# Patient Record
Sex: MALE | Race: WHITE | URBAN - METROPOLITAN AREA
[De-identification: names, ages, dates, MRNs, and addresses within clinical notes are randomized per-mention and may not be internally consistent; named-entity substitution may affect disease eponyms.]

---

## 2017-01-31 ENCOUNTER — OFFICE VISIT (OUTPATIENT)
Dept: NEUROLOGY | Age: 77
End: 2017-01-31

## 2017-01-31 VITALS
DIASTOLIC BLOOD PRESSURE: 80 MMHG | HEART RATE: 66 BPM | SYSTOLIC BLOOD PRESSURE: 142 MMHG | TEMPERATURE: 98.2 F | RESPIRATION RATE: 16 BRPM | HEIGHT: 72 IN | OXYGEN SATURATION: 98 % | WEIGHT: 206.2 LBS | BODY MASS INDEX: 27.93 KG/M2

## 2017-01-31 DIAGNOSIS — F03.91 DEMENTIA WITH BEHAVIORAL DISTURBANCE, UNSPECIFIED DEMENTIA TYPE: Primary | ICD-10-CM

## 2017-01-31 RX ORDER — RISPERIDONE 0.5 MG/1
TABLET, FILM COATED ORAL 2 TIMES DAILY
COMMUNITY

## 2017-01-31 RX ORDER — LORAZEPAM 1 MG/1
1 TABLET ORAL
COMMUNITY

## 2017-01-31 NOTE — PATIENT INSTRUCTIONS
[address] [facility]   Statement to Patients  April 1, 2014      In an effort to ensure the large volume of patient prescription refills is processed in the most efficient and expeditious manner, we are asking our patients to assist us by calling your Pharmacy for all prescription refills, this will include also your  Mail Order Pharmacy. The pharmacy will contact our office electronically to continue the refill process. Please do not wait until the last minute to call your pharmacy. We need at least 48 hours (2days) to fill prescriptions. We also encourage you to call your pharmacy before going to  your prescription to make sure it is ready. With regard to controlled substance prescription refill requests (narcotic refills) that need to be picked up at our office, we ask your cooperation by providing us with at least 72 hours (3days) notice that you will need a refill. We will not refill narcotic prescription refill requests after 4:00pm on any weekday, Monday through Thursday, or after 2:00pm on Fridays, or on the weekends. We encourage everyone to explore another way of getting your prescription refill request processed using CineCoup, our patient web portal through our electronic medical record system. CineCoup is an efficient and effective way to communicate your medication request directly to the office and  downloadable as an imelda on your smart phone . CineCoup also features a review functionality that allows you to view your medication list as well as leave messages for your physician. Are you ready to get connected? If so please review the attatched instructions or speak to any of our staff to get you set up right away! Thank you so much for your cooperation. Should you have any questions please contact our Practice Administrator.     The Physicians and Staff,  [facility] [address]  What is a living will?  A living will is a legal form you use to write down the kind of care you want at the end of your life. It is used by the health professionals who will treat you if you aren't able to decide for yourself. If you put your wishes in writing, your loved ones and others will know what kind of care you want. They won't need to guess. This can ease your mind and be helpful to others. A living will is not the same as an estate or property will. An estate will explains what you want to happen with your money and property after you die. Is a living will a legal document? A living will is a legal document. Each state has its own laws about living dorantes. If you move to another state, make sure that your living will is legal in the state where you now live. Or you might use a universal form that has been approved by many states. This kind of form can sometimes be completed and stored online. Your electronic copy will then be available wherever you have a connection to the Internet. In most cases, doctors will respect your wishes even if you have a form from a different state. · You don't need an  to complete a living will. But legal advice can be helpful if your state's laws are unclear, your health history is complicated, or your family can't agree on what should be in your living will. · You can change your living will at any time. Some people find that their wishes about end-of-life care change as their health changes. · In addition to making a living will, think about completing a medical power of  form. This form lets you name the person you want to make end-of-life treatment decisions for you (your \"health care agent\") if you're not able to. Many hospitals and nursing homes will give you the forms you need to complete a living will and a medical power of . · Your living will is used only if you can't make or communicate decisions for yourself anymore.  If you become able to make decisions again, you can accept or refuse any treatment, no matter what you wrote in your living will. · Your state may offer an online registry. This is a place where you can store your living will online so the doctors and nurses who need to treat you can find it right away. What should you think about when creating a living will? Talk about your end-of-life wishes with your family members and your doctor. Let them know what you want. That way the people making decisions for you won't be surprised by your choices. Think about these questions as you make your living will:  · Do you know enough about life support methods that might be used? If not, talk to your doctor so you know what might be done if you can't breathe on your own, your heart stops, or you're unable to swallow. · What things would you still want to be able to do after you receive life-support methods? Would you want to be able to walk? To speak? To eat on your own? To live without the help of machines? · If you have a choice, where do you want to be cared for? In your home? At a hospital or nursing home? · Do you want certain Druze practices performed if you become very ill? · If you have a choice at the end of your life, where would you prefer to die? At home? In a hospital or nursing home? Somewhere else? · Would you prefer to be buried or cremated? · Do you want your organs to be donated after you die? What should you do with your living will? · Make sure that your family members and your health care agent have copies of your living will. · Give your doctor a copy of your living will to keep in your medical record. If you have more than one doctor, make sure that each one has a copy. · You may want to put a copy of your living will where it can be easily found. Where can you learn more? Go to http://danial-ramone.info/. Enter K181 in the search box to learn more about \"Learning About Living Alec Fung. \"  Current as of: February 24, 2016  Content Version: 11.1  © 1092-5770 [employer], [employer]. Care instructions adapted under license by [employer] (which disclaims liability or warranty for this information). If you have questions about a medical condition or this instruction, always ask your healthcare professional. [address] any warranty or liability for your use of this information. Agree with management scheme so far and the direction with regard to the memory unit at [facility]. In the meantime will get home health recruited as soon as possible with the hospice designation to help the family transition between the current living situation and [facility]. Follow-up with Dr. Ana Maria Harkins the psychiatrist and if the agitation and defiance is trending higher would give her a call and see if she wants to manipulate the dose of Risperdal.  Revisit here in about 4 months if it is convenient. Continue Namenda and Aricept.

## 2017-01-31 NOTE — PROGRESS NOTES
Neurology Consult      Subjective:      Geneva Henry is a 64 y.o. male who returns with advanced dementia and mood and behavioral issues. Understand he is on the Namenda and on the previous visit was started on Aricept. During this early titration he had agitation that had to be taking care of the [facility] unit. Then managed to wander away from his residence and was taken back to [facility]. In the meantime had been placed on Risperdal low-dose and Ativan as needed. Current needs include the memory unit [facility] with the bed opening up on February 10. We will try to bridge the gap with a call to home health and hospice care to make the transition smoother. Currently has some thought and word blocking according to wife and the son says he has good and bad days. Will have a form of sundowning with response to the end of the day happening. Today was quiet and reserved and no spontaneous speech. Seemed to be very introspective and did not want to interact with me or the staff. In its own capacity the Risperdal has helped redirect his negative mood and behavioral issues for the better. Revisit here in 4 months if that can be accomplished and it is feasible etc.         Current Outpatient Prescriptions   Medication Sig Dispense Refill    LORazepam (ATIVAN) 1 mg tablet Take 1 mg by mouth every six (6) hours as needed for Anxiety.  risperiDONE (RISPERDAL) 0.5 mg tablet Take  by mouth two (2) times a day. 5pm and Qhs      donepezil (ARICEPT) 10 mg tablet Take 1 tablet by mouth daily 30 Tab 5    memantine (NAMENDA) 10 mg tablet Take  by mouth two (2) times a day.  aspirin 81 mg chewable tablet Take 20 mg by mouth daily.  carvedilol (COREG) 3.125 mg tablet Take  by mouth two (2) times daily (with meals).  finasteride (PROSCAR) 5 mg tablet Take 5 mg by mouth daily.  sertraline (ZOLOFT) 50 mg tablet Take  by mouth daily.       omega-3 fatty acids-vitamin e 1,000 mg cap Take 1 Cap by mouth.  MULTIVIT-MIN/FA/LYCOPENE/LUT (CENTRUM SILVER ULTRA MEN'S PO) Take  by mouth. Allergies   Allergen Reactions    Pcn [Penicillins] Rash     Past Medical History   Diagnosis Date    Arthritis     CAD (coronary artery disease)     Depression     Headache     Hearing loss     Memory loss     Migraine       Past Surgical History   Procedure Laterality Date    Hx urological      Hx prostatectomy      Hx turp  2005      Social History     Social History    Marital status:      Spouse name: N/A    Number of children: N/A    Years of education: N/A     Occupational History    Not on file. Social History Main Topics    Smoking status: Former Smoker     Years: 25.00    Smokeless tobacco: Never Used    Alcohol use 0.6 oz/week     1 Cans of beer per week    Drug use: No    Sexual activity: Not Currently     Other Topics Concern    Not on file     Social History Narrative      Family History   Problem Relation Age of Onset    Dementia Mother     Dementia Paternal Aunt       Visit Vitals    /80    Pulse 66    Temp 98.2 °F (36.8 °C) (Oral)    Resp 16    Ht 6' (1.829 m)    Wt 93.5 kg (206 lb 3.2 oz)    SpO2 98%    BMI 27.97 kg/m2        Review of Systems:   A comprehensive review of systems was negative except for that written in the HPI. Neuro Exam:     Appearance: The patient is well developed, well nourished, and unable to provide a coherent history and is in no acute distress. Mental Status: Oriented to name only  Mood and affect inappropriate with detached and somewhat castañeda and no spontaneous speech. .   Cranial Nerves:   Intact visual fields. Fundi are benign. KRISTA, EOM's full, no nystagmus, no ptosis. Facial sensation is normal. Corneal reflexes are intact. Facial movement is symmetric. Hearing is normal bilaterally. Palate is midline with normal sternocleidomastoid and trapezius muscles are normal. Tongue is midline.    Motor:  5/5 strength in upper and lower proximal and distal muscles. Normal bulk and tone. No fasciculations. Reflexes:   Deep tendon reflexes 2+/4 and symmetrical.   Sensory:   Normal to touch, pinprick and vibration. Gait:  Normal gait. Tremor:   No tremor noted. Cerebellar:  No cerebellar signs present. Neurovascular:  Normal heart sounds and regular rhythm, peripheral pulses intact, and no carotid bruits. Assessment:   Dementia with behavioral and mood issues. Continue Namenda and Aricept. If defiance agitation and obstruction continues would call the psychiatrist to see if the Risperdal dose needs adjustment. Agree with the need for home health intervention to bridge the gap between current living situation and [facility] memory unit on February 10. Plan:   Revisit in about 4 months if it is feasible and convenient.   Signed by :  Jj Davies MD

## 2017-01-31 NOTE — MR AVS SNAPSHOT
Visit Information Date & Time Provider Department Dept. Phone Encounter #  
 1/31/2017 10:40 AM Lennox Roche MD [facility] [phone] [id number] Follow-up Instructions Return in about 4 months (around 5/31/2017). Upcoming Health Maintenance Date Due DTaP/Tdap/Td series (1 - Tdap) 7/4/1961 ZOSTER VACCINE AGE 60> 7/4/1998 GLAUCOMA SCREENING Q2Y 7/4/2003 Pneumococcal 65+ Low/Medium Risk (1 of 2 - PCV13) 7/4/2003 MEDICARE YEARLY EXAM 7/4/2003 INFLUENZA AGE 9 TO ADULT 8/1/2016 Allergies as of 1/31/2017  Review Complete On: 11/22/2016 By: Jean Claude Bennett NP Severity Noted Reaction Type Reactions Pcn [Penicillins]  12/16/2015    Rash Current Immunizations  Never Reviewed No immunizations on file. Not reviewed this visit You Were Diagnosed With   
  
 Codes Comments Dementia with behavioral disturbance, unspecified dementia type    -  Primary ICD-10-CM: F03.91 
ICD-9-CM: 294.21 Vitals BP Pulse Temp Resp Height(growth percentile) Weight(growth percentile) 142/80 66 98.2 °F (36.8 °C) (Oral) 16 6' (1.829 m) 206 lb 3.2 oz (93.5 kg) SpO2 BMI Smoking Status 98% 27.97 kg/m2 Former Smoker Vitals History BMI and BSA Data Body Mass Index Body Surface Area  
 27.97 kg/m 2 2.18 m 2 Preferred Pharmacy Pharmacy Name Phone [facility] [phone] Your Updated Medication List  
  
   
This list is accurate as of: 1/31/17 12:03 PM.  Always use your most recent med list.  
  
  
  
  
 aspirin 81 mg chewable tablet Take 20 mg by mouth daily. carvedilol 3.125 mg tablet Commonly known as:  Carlin Lawman Take  by mouth two (2) times daily (with meals). CENTRUM SILVER ULTRA MEN'S PO Take  by mouth. donepezil 10 mg tablet Commonly known as:  ARICEPT Take 1 tablet by mouth daily finasteride 5 mg tablet Commonly known as:  PROSCAR Take 5 mg by mouth daily. LORazepam 1 mg tablet Commonly known as:  ATIVAN Take 1 mg by mouth every six (6) hours as needed for Anxiety. memantine 10 mg tablet Commonly known as:  Jessica Fleischer Take  by mouth two (2) times a day. omega-3 fatty acids-vitamin e 1,000 mg Cap Take 1 Cap by mouth. RisperDAL 0.5 mg tablet Generic drug:  risperiDONE Take  by mouth two (2) times a day. 5pm and Qhs  
  
 sertraline 50 mg tablet Commonly known as:  ZOLOFT Take  by mouth daily. Follow-up Instructions Return in about 4 months (around 5/31/2017). Patient Instructions PRESCRIPTION REFILL POLICY [facility] Statement to Patients April 1, 2014 In an effort to ensure the large volume of patient prescription refills is processed in the most efficient and expeditious manner, we are asking our patients to assist us by calling your Pharmacy for all prescription refills, this will include also your  Mail Order Pharmacy. The pharmacy will contact our office electronically to continue the refill process. Please do not wait until the last minute to call your pharmacy. We need at least 48 hours (2days) to fill prescriptions. We also encourage you to call your pharmacy before going to  your prescription to make sure it is ready. With regard to controlled substance prescription refill requests (narcotic refills) that need to be picked up at our office, we ask your cooperation by providing us with at least 72 hours (3days) notice that you will need a refill. We will not refill narcotic prescription refill requests after 4:00pm on any weekday, Monday through Thursday, or after 2:00pm on Fridays, or on the weekends.   
  
We encourage everyone to explore another way of getting your prescription refill request processed using Buyapowa, our patient web portal through our electronic medical record system. Zizerones is an efficient and effective way to communicate your medication request directly to the office and  downloadable as an imelda on your smart phone . Zizerones also features a review functionality that allows you to view your medication list as well as leave messages for your physician. Are you ready to get connected? If so please review the attatched instructions or speak to any of our staff to get you set up right away! Thank you so much for your cooperation. Should you have any questions please contact our Practice Administrator. The Physicians and Staff,  [facility] [address] What is a living will? A living will is a legal form you use to write down the kind of care you want at the end of your life. It is used by the health professionals who will treat you if you aren't able to decide for yourself. If you put your wishes in writing, your loved ones and others will know what kind of care you want. They won't need to guess. This can ease your mind and be helpful to others. A living will is not the same as an estate or property will. An estate will explains what you want to happen with your money and property after you die. Is a living will a legal document? A living will is a legal document. Each state has its own laws about living dorantes. If you move to another state, make sure that your living will is legal in the state where you now live. Or you might use a universal form that has been approved by many states. This kind of form can sometimes be completed and stored online. Your electronic copy will then be available wherever you have a connection to the Internet. In most cases, doctors will respect your wishes even if you have a form from a different state. · You don't need an  to complete a living will.  But legal advice can be helpful if your state's laws are unclear, your health history is complicated, or your family can't agree on what should be in your living will. · You can change your living will at any time. Some people find that their wishes about end-of-life care change as their health changes. · In addition to making a living will, think about completing a medical power of  form. This form lets you name the person you want to make end-of-life treatment decisions for you (your \"health care agent\") if you're not able to. Many hospitals and nursing homes will give you the forms you need to complete a living will and a medical power of . · Your living will is used only if you can't make or communicate decisions for yourself anymore. If you become able to make decisions again, you can accept or refuse any treatment, no matter what you wrote in your living will. · Your state may offer an online registry. This is a place where you can store your living will online so the doctors and nurses who need to treat you can find it right away. What should you think about when creating a living will? Talk about your end-of-life wishes with your family members and your doctor. Let them know what you want. That way the people making decisions for you won't be surprised by your choices. Think about these questions as you make your living will: · Do you know enough about life support methods that might be used? If not, talk to your doctor so you know what might be done if you can't breathe on your own, your heart stops, or you're unable to swallow. · What things would you still want to be able to do after you receive life-support methods? Would you want to be able to walk? To speak? To eat on your own? To live without the help of machines? · If you have a choice, where do you want to be cared for? In your home? At a hospital or nursing home? · Do you want certain Religion practices performed if you become very ill? · If you have a choice at the end of your life, where would you prefer to die? At home? In a hospital or nursing home? Somewhere else? · Would you prefer to be buried or cremated? · Do you want your organs to be donated after you die? What should you do with your living will? · Make sure that your family members and your health care agent have copies of your living will. · Give your doctor a copy of your living will to keep in your medical record. If you have more than one doctor, make sure that each one has a copy. · You may want to put a copy of your living will where it can be easily found. Where can you learn more? Go to http://danial-ramone.info/. Enter G221 in the search box to learn more about \"Learning About Living Perroy. \" Current as of: February 24, 2016 Content Version: 11.1 © 4101-8893 [employer]ure. Care instructions adapted under license by [employer] (which disclaims liability or warranty for this information). If you have questions about a medical condition or this instruction, always ask your healthcare professional. [place] [address] any warranty or liability for your use of this information. Agree with management scheme so far and the direction with regard to the memory unit at [facility]. In the meantime will get home health recruited as soon as possible with the hospice designation to help the family transition between the current living situation and the memory unit at [facility]. Follow-up with Dr. Worrell Vermilion the psychiatrist and if the agitation and defiance is trending higher would give her a call and see if she wants to manipulate the dose of Risperdal.  Revisit here in about 4 months if it is convenient. Continue Namenda and Aricept. Introducing [facility]!    
 Flor Nichols introduces [email or web address] patient portal. Now you can access parts of your medical record, email your doctor's office, and request medication refills online. 1. In your internet browser, go to [email or web address] 2. Click on the First Time User? Click Here link in the Sign In box. You will see the New Member Sign Up page. 3. Enter your Glassbeam Access Code exactly as it appears below. You will not need to use this code after youve completed the sign-up process. If you do not sign up before the expiration date, you must request a new code. · Glassbeam Access Code: [email or web address] Expires: 2/20/2017  8:44 AM 
 
4. Enter the last four digits of your Social Security Number (xxxx) and Date of Birth (mm/dd/yyyy) as indicated and click Submit. You will be taken to the next sign-up page. 5. Create a Glassbeam ID. This will be your Glassbeam login ID and cannot be changed, so think of one that is secure and easy to remember. 6. Create a Glassbeam password. You can change your password at any time. 7. Enter your Password Reset Question and Answer. This can be used at a later time if you forget your password. 8. Enter your e-mail address. You will receive e-mail notification when new information is available in [address] 9. Click Sign Up. You can now view and download portions of your medical record. 10. Click the Download Summary menu link to download a portable copy of your medical information. If you have questions, please visit the Frequently Asked Questions section of the Glassbeam website. Remember, Glassbeam is NOT to be used for urgent needs. For medical emergencies, dial 911. Now available from your iPhone and Android! Please provide this summary of care documentation to your next provider. Your primary care clinician is listed as Beau Lewis III. If you have any questions after today's visit, please call [phone].